# Patient Record
Sex: MALE | Race: WHITE | NOT HISPANIC OR LATINO | Employment: FULL TIME | URBAN - NONMETROPOLITAN AREA
[De-identification: names, ages, dates, MRNs, and addresses within clinical notes are randomized per-mention and may not be internally consistent; named-entity substitution may affect disease eponyms.]

---

## 2017-02-01 ENCOUNTER — TELEPHONE (OUTPATIENT)
Dept: FAMILY MEDICINE CLINIC | Facility: CLINIC | Age: 37
End: 2017-02-01

## 2017-02-01 NOTE — TELEPHONE ENCOUNTER
PATIENT WAS SEEN 12/21/16. HE IS APPROPRIATE. MED AGREEMENT IS UP TO DATE 6/2016 AND UDS APPROPRIATE 9/2016. LAST RF ADDERALL 12/21/16- LAKSHMI IS APPROPRIATE WITH ONLY ADDERALL FROM DR VERMA ABOUT EVERY OTHER MONTH. HE DID NOT THINK HE WAS ALLOWED TO TAKE DAILY BUT FELT HE NEEDED DAILY MEDICATION. I ADVISED AT APPT THAT HE CAN TAKE RX ONCE DAILY AS NEEDED. PLEASE ADVISE FOR REFILL OF ADDERALL 10 MG 1 PO QD. #30.0 RF

## 2017-02-01 NOTE — TELEPHONE ENCOUNTER
----- Message from Zelda Pacheco sent at 2/1/2017 11:54 AM EST -----  Contact: [phone]  PT CALLED AND HE NEEDS HIS ADDERALL REFILLED      LAKSHMI PRINTED

## 2017-02-02 DIAGNOSIS — F90.9 ATTENTION DEFICIT HYPERACTIVITY DISORDER (ADHD), UNSPECIFIED ADHD TYPE: ICD-10-CM

## 2017-02-02 RX ORDER — DEXTROAMPHETAMINE SACCHARATE, AMPHETAMINE ASPARTATE, DEXTROAMPHETAMINE SULFATE AND AMPHETAMINE SULFATE 2.5; 2.5; 2.5; 2.5 MG/1; MG/1; MG/1; MG/1
10 TABLET ORAL DAILY
Qty: 30 TABLET | Refills: 0 | Status: SHIPPED | OUTPATIENT
Start: 2017-02-02 | End: 2017-03-23 | Stop reason: SDUPTHER

## 2017-03-21 ENCOUNTER — TELEPHONE (OUTPATIENT)
Dept: FAMILY MEDICINE CLINIC | Facility: CLINIC | Age: 37
End: 2017-03-21

## 2017-03-22 ENCOUNTER — TELEPHONE (OUTPATIENT)
Dept: FAMILY MEDICINE CLINIC | Facility: CLINIC | Age: 37
End: 2017-03-22

## 2017-03-22 NOTE — TELEPHONE ENCOUNTER
PATIENT WAS SEEN 12/21/16. HAD APPT YESTERDAY THAT HE WAS UNABLE TO KEEP. MADE FOLLOW UP APPT 3/27/17. HE IS APPROPRIATE. MED AGREEMENT IS UP TO DATE 6/2016 AND UDS APPROPRIATE 9/2016. LAST RF ADDERALL 2/11/16- LAKSHMI IS APPROPRIATE WITH ONLY ADDERALL FROM DR VERMA ABOUT EVERY OTHER MONTH. HE DID NOT THINK HE WAS ALLOWED TO TAKE DAILY BUT FELT HE NEEDED DAILY MEDICATION. I ADVISED AT APPT THAT HE CAN TAKE RX ONCE DAILY AS NEEDED. PLEASE ADVISE FOR REFILL OF ADDERALL 10 MG 1 PO QD. #30.0 RF

## 2017-03-22 NOTE — TELEPHONE ENCOUNTER
----- Message from Meño Back sent at 3/21/2017  3:38 PM EDT -----  Regarding: Adderall  Patient is requesting his adderall. Patient has 1 day left.     Thanks!

## 2017-03-23 DIAGNOSIS — F90.9 ATTENTION DEFICIT HYPERACTIVITY DISORDER (ADHD), UNSPECIFIED ADHD TYPE: ICD-10-CM

## 2017-03-23 RX ORDER — DEXTROAMPHETAMINE SACCHARATE, AMPHETAMINE ASPARTATE, DEXTROAMPHETAMINE SULFATE AND AMPHETAMINE SULFATE 2.5; 2.5; 2.5; 2.5 MG/1; MG/1; MG/1; MG/1
10 TABLET ORAL DAILY
Qty: 30 TABLET | Refills: 0 | Status: SHIPPED | OUTPATIENT
Start: 2017-03-23 | End: 2017-04-25 | Stop reason: SDUPTHER

## 2017-03-23 NOTE — TELEPHONE ENCOUNTER
PLEASE LET PATIENT KNOW RX IS READY FOR PICKUP. FIND OUT IF HE WANTS TO  AT [facility] OR WAIT FOR RX UNTIL IT IS BROUGHT HERE.

## 2017-03-24 ENCOUNTER — TELEPHONE (OUTPATIENT)
Dept: FAMILY MEDICINE CLINIC | Facility: CLINIC | Age: 37
End: 2017-03-24

## 2017-03-24 NOTE — TELEPHONE ENCOUNTER
WE WILL DISCUSS LAB RESULTS FROM 3/15 AT HIS FOLLOW UP APPT 3/27/17. HE DID NOT COME FOR APPT THIS WEEK.

## 2017-03-24 NOTE — TELEPHONE ENCOUNTER
----- Message from Zelda Pacheco sent at 3/24/2017  2:56 PM EDT -----  Contact: [phone]  PT WOULD LIKE TO KNOW IF HIS LAB RESULTS FROM 2 WEEKS

## 2017-03-27 ENCOUNTER — OFFICE VISIT (OUTPATIENT)
Dept: FAMILY MEDICINE CLINIC | Facility: CLINIC | Age: 37
End: 2017-03-27

## 2017-03-27 VITALS
TEMPERATURE: 97.2 F | SYSTOLIC BLOOD PRESSURE: 128 MMHG | OXYGEN SATURATION: 98 % | BODY MASS INDEX: 32.74 KG/M2 | DIASTOLIC BLOOD PRESSURE: 70 MMHG | WEIGHT: 247 LBS | HEART RATE: 78 BPM | HEIGHT: 73 IN

## 2017-03-27 DIAGNOSIS — E78.2 MIXED HYPERLIPIDEMIA: ICD-10-CM

## 2017-03-27 DIAGNOSIS — F90.2 ATTENTION DEFICIT HYPERACTIVITY DISORDER (ADHD), COMBINED TYPE: Primary | ICD-10-CM

## 2017-03-27 DIAGNOSIS — R79.89 ABNORMAL LIVER FUNCTION TESTS: ICD-10-CM

## 2017-03-27 DIAGNOSIS — E55.9 VITAMIN D DEFICIENCY: ICD-10-CM

## 2017-03-27 PROCEDURE — 99214 OFFICE O/P EST MOD 30 MIN: CPT | Performed by: PHYSICIAN ASSISTANT

## 2017-03-27 RX ORDER — ATORVASTATIN CALCIUM 40 MG/1
40 TABLET, FILM COATED ORAL DAILY
Qty: 30 TABLET | Refills: 0 | Status: SHIPPED | OUTPATIENT
Start: 2017-03-27 | End: 2018-09-10

## 2017-03-27 RX ORDER — ICOSAPENT ETHYL 1000 MG/1
2 CAPSULE ORAL 2 TIMES DAILY WITH MEALS
Qty: 120 CAPSULE | Refills: 2 | Status: SHIPPED | OUTPATIENT
Start: 2017-03-27 | End: 2018-09-10

## 2017-03-27 RX ORDER — CHOLECALCIFEROL (VITAMIN D3) 50 MCG
2000 TABLET ORAL DAILY
Qty: 30 TABLET | Refills: 3 | Status: SHIPPED | OUTPATIENT
Start: 2017-03-27 | End: 2018-03-27

## 2017-04-11 NOTE — PATIENT INSTRUCTIONS
36 YEAR OLD MALE WHO PRESENTS TODAY IN FOLLOW UP OF ADHD AND ABNORMAL LABS. PATIENT REPORTS BEING OUT OF MEDICATION DUE TO NOT GETTING RX PICKED UP. WHEN ON MEDICATION, HE HAS NO AE AND RX HELPS FOCUS. CONTINUE MEDICATION AS DIRECTED AND FOLLOW UP IN 3 MONTHS.     PATIENT HAS MIXED HYPERLIPIDEMIA. HE WAS TO BE ON VASCEPA FOR TG > 500. HE HAS NOT BEEN TAKING MEDICATION. WITH ELEVATED LIPIDS, HE NEEDS TO RESTART VASCEPA AND START STATIN. I WILL GIVE LIPITOR 40 MG AT BEDTIME. HE ALSO HAS ELEVATED LFT. I DISCUSSED THAT IT IS IMPERATIVE THAT HE FOLLOW CLOSELY FOR RECHECK CMP AND CPK ON STATIN. I WILL ALSO REFER TO GI. PATIENT WAS ALSO FOUND TO HAVE LOW VIT D. HE SHOULD TAKE VIT D AS DIRECTED. FOLLOW UP WITH ME IN 1 MONTH TO REVIEW AE OF CHOLESTEROL RX AND RECHECK CMP AND CPK AND FOLLOW UP IN 3 MONTHS, FASTING, TO RECHECK LIPID, CMP, CPK, AND VITAMIN D. PATIENT TO BE SEEN ASAP IF ANY PROBLEMS OR SYMPTOMS.

## 2017-04-20 ENCOUNTER — TELEPHONE (OUTPATIENT)
Dept: FAMILY MEDICINE CLINIC | Facility: CLINIC | Age: 37
End: 2017-04-20

## 2017-04-20 NOTE — TELEPHONE ENCOUNTER
----- Message from Meño Back sent at 4/20/2017  8:32 AM EDT -----  Regarding: ADDERALL   Contact: [phone]  PATIENT IS REQUESTING HIS ADDERALL 10 MG. PATIENT IS SCHEDULED FOR AN APPOINTMENT ON 4/25/17.     PLEASE CALL [phone]

## 2017-04-21 ENCOUNTER — TELEPHONE (OUTPATIENT)
Dept: FAMILY MEDICINE CLINIC | Facility: CLINIC | Age: 37
End: 2017-04-21

## 2017-04-21 ENCOUNTER — OFFICE VISIT (OUTPATIENT)
Dept: GASTROENTEROLOGY | Facility: CLINIC | Age: 37
End: 2017-04-21

## 2017-04-21 VITALS
DIASTOLIC BLOOD PRESSURE: 78 MMHG | BODY MASS INDEX: 32.07 KG/M2 | SYSTOLIC BLOOD PRESSURE: 118 MMHG | WEIGHT: 242 LBS | HEIGHT: 73 IN

## 2017-04-21 DIAGNOSIS — E78.2 MIXED HYPERLIPIDEMIA: ICD-10-CM

## 2017-04-21 DIAGNOSIS — R79.89 ABNORMAL LIVER FUNCTION TESTS: Primary | ICD-10-CM

## 2017-04-21 PROCEDURE — 99203 OFFICE O/P NEW LOW 30 MIN: CPT | Performed by: INTERNAL MEDICINE

## 2017-04-21 NOTE — TELEPHONE ENCOUNTER
----- Message from Meño Back sent at 4/21/2017  2:13 PM EDT -----  Regarding: NEED LAB ORDERS  Contact: [phone]  NEED LAB ORDERS.  PATIENT IS NOT HAVING ANY CHEST PAINS, LEG PAIN. HE IS HAVING SOME SIDE PAIN, BUT HAD AN APPOINTMENT WITH DR. RICHMOND TODAY AND HAD SOME LABS DRAWN TODAY.    PATIENT IS SCHEDULED FOR Monday MORNING FOR LABS AND FOLLOW UP IN June FOR MED CHECK.    THANKS!

## 2017-04-21 NOTE — PROGRESS NOTES
Chief Complaint   Patient presents with   • Elevated Hepatic Enzymes       Vladimir Herron is a 36 y.o. male who presents with Mild elevation in transaminases, ALT 68    HPI Comments: 36-year-old gentleman, overweight, elevated lipids, presents with a mild elevation in the ALT.  ALT 3 months ago was 55 it is now 68.  The rest of the liver tests are normal.  There are no hepatitis risk factors.  There are no family history of liver disease.  There is no excess alcohol consumption.  He has no abdominal pain, nausea, vomiting, jaundice, tea colored urine or acholic stools.  He has no change in bowel habits, dysphagia, odynophagia, GERD.      Past Medical History:   Diagnosis Date   • Acute maxillary antritis    • Allergy    • Esophageal reflux    • Fatigue     Symptoms of fatigue.  PT states that he falls asleep easily.  He does snore.  Recommend sleeping on his back and referral for sleep evaluation.   • Lumbar strain    • Shoulder pain     pain in join of right shoulder   • Viral gastroenteritis        Past Surgical History:   Procedure Laterality Date   • EYE SURGERY     • KNEE SURGERY Left          Current Outpatient Prescriptions:   •  amphetamine-dextroamphetamine (ADDERALL) 10 MG tablet, Take 1 tablet by mouth Daily. For ADHD Symptoms., Disp: 30 tablet, Rfl: 0  •  atorvastatin (LIPITOR) 40 MG tablet, Take 1 tablet by mouth Daily., Disp: 30 tablet, Rfl: 0  •  Cholecalciferol (VITAMIN D) 2000 UNITS tablet, Take 2,000 Units by mouth Daily., Disp: 30 tablet, Rfl: 3  •  icosapent ethyl (VASCEPA) 1 G capsule capsule, Take 2 g by mouth 2 (Two) Times a Day With Meals., Disp: 120 capsule, Rfl: 2  •  mupirocin (BACTROBAN NASAL) 2 % nasal ointment, into each nostril 3 (three) times a day., Disp: 10 g, Rfl: 0    No Known Allergies    Social History     Social History   • Marital status:      Spouse name: N/A   • Number of children: N/A   • Years of education: N/A     Occupational History   • Not on file.      Social History Main Topics   • Smoking status: Current Every Day Smoker     Packs/day: 0.50     Years: 20.00     Types: Electronic Cigarette   • Smokeless tobacco: Never Used   • Alcohol use No   • Drug use: No   • Sexual activity: Not on file     Other Topics Concern   • Not on file     Social History Narrative       Family History   Problem Relation Age of Onset   • Hypertension Mother    • Diabetes Mother    • Lung cancer Other    • Diabetes Maternal Aunt    • Heart attack Paternal Uncle    • Diabetes Maternal Grandmother        Review of Systems   Constitutional: Negative.    All other systems reviewed and are negative.      Vitals:    04/21/17 1303   BP: 118/78       Physical Exam   Constitutional: He is oriented to person, place, and time. He appears well-developed and well-nourished.   HENT:   Head: Normocephalic and atraumatic.   Eyes: EOM are normal. Pupils are equal, round, and reactive to light.   Cardiovascular: Normal rate, regular rhythm and normal heart sounds.    Pulmonary/Chest: Effort normal.   Abdominal: Soft. Bowel sounds are normal. He exhibits no distension and no mass. There is no tenderness. No hernia.   Musculoskeletal: Normal range of motion.   Neurological: He is alert and oriented to person, place, and time.   Skin: Skin is dry.   Psychiatric: He has a normal mood and affect. His behavior is normal. Judgment and thought content normal.       Problem list    Elevated ALT  Patient is overweight with a BMI greater than 27  Elevated lipids      Assessment/Plan    We will perform a right upper quadrant ultrasound to look for hepatic steatosis  We will perform a full serological workup for liver disease including tests for hemochromatosis, Shaun's disease, acute viral hepatitis, etc.    Based on the fact that his ALT is much less than 100 I see no indication for liver biopsy at this time, the chance of him having CORONA or fibrosis is very unlikely    We have discussed healthy diet, exercise  to try to improve liver number    I see no contraindication to HMG COA REDUCTASE inhibitor, gemfibrozil, or any other lipid-lowering agent based on that ALT.  Just make sure to check his liver function tests 3 times yearl on cholesterol medication    We will see him back as needed

## 2017-04-22 LAB
A1AT SERPL-MCNC: 143 MG/DL (ref 90–200)
ACTIN IGG SERPL-ACNC: 5 UNITS (ref 0–19)
CERULOPLASMIN SERPL-MCNC: 31.8 MG/DL (ref 16–31)
FERRITIN SERPL-MCNC: 100.4 NG/ML (ref 30–400)
HAV IGM SERPL QL IA: NEGATIVE
HBV CORE IGM SERPL QL IA: NEGATIVE
HBV SURFACE AG SERPL QL IA: NEGATIVE
HCV AB S/CO SERPL IA: <0.1 S/CO RATIO (ref 0–0.9)
IRON SATN MFR SERPL: 27 % (ref 20–50)
IRON SERPL-MCNC: 115 MCG/DL (ref 59–158)
TIBC SERPL-MCNC: 430 MCG/DL
UIBC SERPL-MCNC: 315 MCG/DL

## 2017-04-24 LAB
ALBUMIN SERPL-MCNC: 4.6 G/DL (ref 3.5–5.2)
ALBUMIN/GLOB SERPL: 1.5 G/DL
ALP SERPL-CCNC: 77 U/L (ref 39–117)
ALT SERPL-CCNC: 80 U/L (ref 1–41)
AST SERPL-CCNC: 30 U/L (ref 1–40)
BILIRUB SERPL-MCNC: 0.9 MG/DL (ref 0.1–1.2)
BUN SERPL-MCNC: 14 MG/DL (ref 6–20)
BUN/CREAT SERPL: 10.6 (ref 7–25)
CALCIUM SERPL-MCNC: 9.9 MG/DL (ref 8.6–10.5)
CHLORIDE SERPL-SCNC: 101 MMOL/L (ref 98–107)
CK SERPL-CCNC: 192 U/L (ref 20–200)
CO2 SERPL-SCNC: 22.4 MMOL/L (ref 22–29)
CREAT SERPL-MCNC: 1.32 MG/DL (ref 0.76–1.27)
GLOBULIN SER CALC-MCNC: 3.1 GM/DL
GLUCOSE SERPL-MCNC: 108 MG/DL (ref 65–99)
POTASSIUM SERPL-SCNC: 4.3 MMOL/L (ref 3.5–5.2)
PROT SERPL-MCNC: 7.7 G/DL (ref 6–8.5)
SODIUM SERPL-SCNC: 139 MMOL/L (ref 136–145)

## 2017-04-25 DIAGNOSIS — F90.9 ATTENTION DEFICIT HYPERACTIVITY DISORDER (ADHD), UNSPECIFIED ADHD TYPE: ICD-10-CM

## 2017-04-25 DIAGNOSIS — N28.9 ABNORMAL RENAL FUNCTION: Primary | ICD-10-CM

## 2017-04-25 RX ORDER — DEXTROAMPHETAMINE SACCHARATE, AMPHETAMINE ASPARTATE, DEXTROAMPHETAMINE SULFATE AND AMPHETAMINE SULFATE 2.5; 2.5; 2.5; 2.5 MG/1; MG/1; MG/1; MG/1
10 TABLET ORAL DAILY
Qty: 30 TABLET | Refills: 0 | Status: SHIPPED | OUTPATIENT
Start: 2017-04-25

## 2017-04-25 NOTE — TELEPHONE ENCOUNTER
PATIENT WAS SEEN 3/27/17. HE IS APPROPRIATE. MED AGREEMENT IS UP TO DATE 6/2016 AND UDS APPROPRIATE 9/2016. LAST RF ADDERALL 3/26/17- LAKSHMI IS APPROPRIATE WITH ONLY ADDERALL FROM DR VERMA. PLEASE ADVISE FOR REFILL OF ADDERALL 10 MG 1 PO QD. #30.0 RF due on 4/26/17.

## 2017-04-26 ENCOUNTER — TELEPHONE (OUTPATIENT)
Dept: GASTROENTEROLOGY | Facility: CLINIC | Age: 37
End: 2017-04-26

## 2017-04-26 ENCOUNTER — RESULTS ENCOUNTER (OUTPATIENT)
Dept: GASTROENTEROLOGY | Facility: CLINIC | Age: 37
End: 2017-04-26

## 2017-04-26 DIAGNOSIS — R79.89 ABNORMAL LIVER FUNCTION TESTS: ICD-10-CM

## 2017-04-26 NOTE — TELEPHONE ENCOUNTER
PLEASE INFORM PATIENT RX IS READY FOR  AT [facility]. HE CAN  THERE OR SEE WHEN MICHEL WILL BE PICKING UP RX TO BRING BACK TO OFFICE- CAN WAIT IF HE PREFERS.

## 2017-04-26 NOTE — TELEPHONE ENCOUNTER
Called pt and advised that per Dr Lewis: his labs all came back normal. Advised he should proceed with the U/S that is scheduled tomorrow. Pt verb understanding.

## 2017-04-27 ENCOUNTER — HOSPITAL ENCOUNTER (OUTPATIENT)
Dept: ULTRASOUND IMAGING | Facility: HOSPITAL | Age: 37
Discharge: HOME | End: 2017-04-27
Attending: INTERNAL MEDICINE | Admitting: INTERNAL MEDICINE

## 2017-04-27 PROCEDURE — 76700 US EXAM ABDOM COMPLETE: CPT

## 2017-04-30 NOTE — PROGRESS NOTES
labs and US c/w NAFLD.    No indication for liver biopsy  Continue low fat diet  Achieve ideal body weight and BMI 25  Lipid control per direction of PCP    OV 6mos

## 2017-05-08 ENCOUNTER — TELEPHONE (OUTPATIENT)
Dept: GASTROENTEROLOGY | Facility: CLINIC | Age: 37
End: 2017-05-08